# Patient Record
Sex: MALE | Race: BLACK OR AFRICAN AMERICAN | NOT HISPANIC OR LATINO | Employment: FULL TIME | URBAN - METROPOLITAN AREA
[De-identification: names, ages, dates, MRNs, and addresses within clinical notes are randomized per-mention and may not be internally consistent; named-entity substitution may affect disease eponyms.]

---

## 2023-05-18 ENCOUNTER — APPOINTMENT (OUTPATIENT)
Dept: PRIMARY CARE | Facility: CLINIC | Age: 33
End: 2023-05-18
Payer: MEDICARE

## 2023-06-19 LAB
ANION GAP IN SER/PLAS: 12 MMOL/L (ref 10–20)
BASOPHILS (10*3/UL) IN BLOOD BY MANUAL COUNT - WAM: 0.22 X10E9/L (ref 0–0.1)
BASOPHILS/100 LEUKOCYTES IN BLOOD BY MANUAL COUNT - WAM: 2 % (ref 0–2)
CALCIUM (MG/DL) IN SER/PLAS: 9.6 MG/DL (ref 8.6–10.3)
CARBON DIOXIDE, TOTAL (MMOL/L) IN SER/PLAS: 29 MMOL/L (ref 21–32)
CHLORIDE (MMOL/L) IN SER/PLAS: 100 MMOL/L (ref 98–107)
CREATININE (MG/DL) IN SER/PLAS: 0.75 MG/DL (ref 0.5–1.3)
EOSINOPHILS (10*3/UL) IN BLOOD BY MANUAL COUNT - WAM: 0.22 X10E9/L (ref 0–0.7)
EOSINOPHILS/100 LEUKOCYTES IN BLOOD BY MANUAL COUNT - WAM: 2 % (ref 0–6)
ERYTHROCYTE DISTRIBUTION WIDTH (RATIO) BY AUTOMATED COUNT: 15.2 % (ref 11.5–14.5)
ERYTHROCYTE MEAN CORPUSCULAR HEMOGLOBIN CONCENTRATION (G/DL) BY AUTOMATED: 30.8 G/DL (ref 32–36)
ERYTHROCYTE MEAN CORPUSCULAR VOLUME (FL) BY AUTOMATED COUNT: 73 FL (ref 80–100)
ERYTHROCYTES (10*6/UL) IN BLOOD BY AUTOMATED COUNT: 5.51 X10E12/L (ref 4.5–5.9)
GFR MALE: >90 ML/MIN/1.73M2
GLUCOSE (MG/DL) IN SER/PLAS: 85 MG/DL (ref 74–99)
HEMATOCRIT (%) IN BLOOD BY AUTOMATED COUNT: 40.3 % (ref 41–52)
HEMOGLOBIN (G/DL) IN BLOOD: 12.4 G/DL (ref 13.5–17.5)
IMMATURE GRANULOCYTES/100 LEUKOCYTES IN BLOOD BY AUTOMATED COUNT: 0.3 % (ref 0–0.9)
INR IN PPP BY COAGULATION ASSAY: 1.1 (ref 0.9–1.1)
LEUKOCYTES (10*3/UL) IN BLOOD BY AUTOMATED COUNT: 10.9 X10E9/L (ref 4.4–11.3)
LYMPHOCYTES (10*3/UL) IN BLOOD BY MANUAL COUNT - WAM: 3.16 X10E9/L (ref 1.2–4.8)
LYMPHOCYTES VARIANT/100 LEUKOCYTES IN BLOOD - WAM: 1 % (ref 0–2)
LYMPHOCYTES/100 LEUKOCYTES IN BLOOD BY MANUAL COUNT - WAM: 29 % (ref 13–44)
MANUAL DIFFERENTIAL Y/N: ABNORMAL
MONOCYTES (10*3/UL) IN BLOOD BY MANUAL COUNT - WAM: 0.98 X10E9/L (ref 0.1–1)
MONOCYTES/100 LEUKOCYTES IN BLOOD BY MANUAL COUNT - WAM: 9 % (ref 2–10)
NEUTROPHILS (SEGS+BANDS) (10*3/UL) MANUAL COUNT - WAM: 6.21 X10E9/L (ref 1.2–7.7)
PLATELETS (10*3/UL) IN BLOOD AUTOMATED COUNT: 215 X10E9/L (ref 150–450)
POTASSIUM (MMOL/L) IN SER/PLAS: 3.7 MMOL/L (ref 3.5–5.3)
PROTHROMBIN TIME (PT) IN PPP BY COAGULATION ASSAY: 12.7 SEC (ref 9.8–13.4)
RBC MORPHOLOGY IN BLOOD: NORMAL
SEGMENTED NEUTROPHILS (10*3/UL) BLOOD MANUAL - WAM: 6.21 X10E9/L (ref 1.2–7)
SEGMENTED NEUTROPHILS/100 LEUKOCYTES BY MANUAL COUNT -: 57 % (ref 40–80)
SODIUM (MMOL/L) IN SER/PLAS: 137 MMOL/L (ref 136–145)
UREA NITROGEN (MG/DL) IN SER/PLAS: 12 MG/DL (ref 6–23)
VARIANT LYMPHOCYTES (10*3/UL) BLOOD MANUAL COUNT - WAM: 0.11 X10E9/L (ref 0–0.5)

## 2023-06-22 ENCOUNTER — HOSPITAL ENCOUNTER (OUTPATIENT)
Dept: DATA CONVERSION | Facility: HOSPITAL | Age: 33
End: 2023-06-22
Attending: ORTHOPAEDIC SURGERY | Admitting: ORTHOPAEDIC SURGERY
Payer: MEDICARE

## 2023-06-22 DIAGNOSIS — F17.200 NICOTINE DEPENDENCE, UNSPECIFIED, UNCOMPLICATED: ICD-10-CM

## 2023-06-22 DIAGNOSIS — Z88.0 ALLERGY STATUS TO PENICILLIN: ICD-10-CM

## 2023-06-22 DIAGNOSIS — S82.61XA DISPLACED FRACTURE OF LATERAL MALLEOLUS OF RIGHT FIBULA, INITIAL ENCOUNTER FOR CLOSED FRACTURE: ICD-10-CM

## 2023-06-22 DIAGNOSIS — S82.891A OTHER FRACTURE OF RIGHT LOWER LEG, INITIAL ENCOUNTER FOR CLOSED FRACTURE: ICD-10-CM

## 2023-06-22 DIAGNOSIS — S93.431A SPRAIN OF TIBIOFIBULAR LIGAMENT OF RIGHT ANKLE, INITIAL ENCOUNTER: ICD-10-CM

## 2023-09-07 VITALS — BODY MASS INDEX: 31.65 KG/M2 | WEIGHT: 246.47 LBS

## 2023-09-30 NOTE — H&P
History & Physical Reviewed:   I have reviewed the History and Physical dated:  19-Jun-2023   History and Physical reviewed and relevant findings noted. Patient examined to review pertinent physical  findings.: No significant changes   Home Medications Reviewed: no changes noted   Allergies Reviewed: no changes noted       ERAS (Enhanced Recovery After Surgery):  ·  ERAS Patient: no     Consent:   COVID-19 Consent:  ·  COVID-19 Risk Consent Surgeon has reviewed key risks related to the risk of mauro COVID-19 and if they contract COVID-19 what the risks are.     Attestation:   Note Completion:  I am a:  Resident/Fellow   Attending Attestation I saw and evaluated the patient.  I personally obtained the key and critical portions of the history and physical exam or was physically present for key and  critical portions performed by the resident/fellow. I reviewed the resident/fellow?s documentation and discussed the patient with the resident/fellow.  I agree with the resident/fellow?s medical decision making as documented in the note.     I personally evaluated the patient on 22-Jun-2023         Electronic Signatures:  Hunter Ryan)  (Signed 01-Jul-2023 20:08)   Authored: Note Completion   Co-Signer: History & Physical Reviewed, ERAS, Consent, Note Completion  Marci West (Resident))  (Signed 22-Jun-2023 12:08)   Authored: History & Physical Reviewed, ERAS, Consent,  Note Completion      Last Updated: 01-Jul-2023 20:08 by Hunter Ryan)

## 2023-10-02 NOTE — OP NOTE
PROCEDURE DETAILS    Preoperative Diagnosis:  Right ankle fracture with joint subluxation and syndesmosis disruption   Postoperative Diagnosis:  Right ankle fracture with joint subluxation and syndesmosis disruption   Surgeon: Dr. Ryan  Resident/Fellow/Other Assistant: Ellyn    Procedure:  1) ORIF R lateral malleolus  2) ORIF R syndesmosis   3) R ankle application of stress for radiograph  Anesthesia: General  Estimated Blood Loss: 25 cc  Blood Replaced: None  Findings:  ankle fracture with wide syndesmosis on stress examination prior to fixation  Specimens(s) Collected: no,     Complications: None  Urine Output: None  Drains and/or Catheters: None  Implants: Decker lateral fibular plate with 3.5 mm screws, 2x elie mini-frag 2.7 mm lag screws, Decker synchfix x1  Tourniquet Times: 104 minutes at 300 mmHg  Additional Details: Weightbearing Status/precautions: NWB RLE  Imaging: None  Perioperative ABx: None   DVT PPx: ASA 81 mg BID  Dressing: Splint  Drain: None  Truong: None    Dispo: Home from PACU  Please call or page with questions or concerns.  Patient Returned To/Condition: PACU to home/Good        Operative Report:   INDICATIONS:  Is a 33-year-old male who sustained a right ankle fracture.  He was seen in the emergency department and was determined to have a spiral lateral malleolus fracture and a widened syndesmosis without a medial malleolus fracture.  He was placed into a splint  in the emergency department and was discharged nonweightbearing for follow-up for surgery.  Surgery was recommended for this patient for pain control, increase function, and restoration of ankle stability.  Risk, benefits, and possible complications were  discussed and the patient was agreeable to proceed with surgery.    DESCRIPTION OF PROCEDURE:     On the day of surgery, Patient was met in the preoperative holding bay prior to surgery.  Informed consent was reviewed.  All risk and benefits were explained  which included but were not limited to bleeding, infection, damage to surrounding structures  and need for further surgery.  All questions were answered and informed consent was obtained.  The right lower extremity was marked.  patient was then brought back from the preoperative holding bay to the operating room.   A timeout was performed confirming  correct patient identity, laterality, and planned procedure.  Patient was then transitioned from the hospital bed to the operating room table.  General anesthesia was induced without complication. Appropriate antibiotics and TXA were given by the anesthesia  team. Patient was positioned supine.  All bony prominences were padded.  Compressive devices placed on the nonoperative lower extremity.  Preoperative antibiotics were redosed.  The right lower extremity was then prepped and draped in normal sterile fashion.     We began with an incision centered over the lateral malleolus from the distal tip to approximately 8 cm proximal.  This incision was taken to the level of the fascia being careful to carefully provide hemostasis to any crossing vessels, as well as being  certain that the superficial peroneal nerve did not cross our field.  The fascia was incised above the fibula along the length of the incision and the fracture was exposed.  The fracture was cleaned of the periosteum at its edges as well as interposed  fracture hematoma.  The fracture was then opened and the ankle joint was washed out of any debris from the fracture.  There appeared to be no chondral injury within the ankle joint.  The fracture was then reduced using a combination of manual traction  on the lamina and 2 pointed reduction clamps.  Being satisfied with our reduction both clinically and fluoroscopically, two 2.7 mm lag screws were placed by technique in the plane orthogonal to the fracture from the anterior to posterior direction.  A  medium length 3.5 mm Synthes lateral fibular plate was then  slid onto the bone and fluoroscopic images were used to confirm appropriate position on the AP and lateral views.  A screw just proximal to the proximal extent of the fracture was placed in order  to fix the plate to the bone proximally.  A unicortical distal cortical screw was then placed in one of the holes in the distal cluster in order to fix the plate to the bone distally.  Again, fluoroscopy was used to confirm our plate was adequately positioned  on both the AP and lateral views and a fracture remained reduced.  Our clamps were then removed and the fracture remained reduced as well.  2 more proximal locking screws and 3 more distal locking screws were then placed about the fracture.    We then performed a stress examination of the ankle which demonstrated obvious widening of both the syndesmosis and medial clear space with external rotation stress test.  Because of this, syndesmosis fixation was indicated.  We elected to use a Synchfix  device to do this.  Our lag screws were blocking the 2 holes closest to the syndesmosis, so the hole just above this was used to provide our syndesmotic fixation.  Fluoroscopy was used to ensure that the suture button fixation was parallel to the joint  on the mortise view and was aiming from posterior to anterior in the center of the tibia on the lateral view.  The guidewire was sent across this and through the skin on the medial side of the leg.  Small incision was made at the exit point of the guidewire,  and the guidewire was removed.  The proximal cortex of the fibula was countersunk so the button would fit into it nicely, and the wire for the suture was threaded through the hole and removed from the medial side.  The suture button was attached and fluoroscopy  was used to ensure that it laid flat on the medial side of the bone.  Suture button nestled into the countersunk hole within the lateral fibular plate and was tightened while holding dorsiflexion and compression  across the syndesmosis.  Suture button  fixation was then tied and cut and fluoroscopic images were used to confirm adequate reduction of the syndesmosis on x-ray.  Both an external rotation stress test and a cotton syndesmosis test were negative at this time.  Being satisfied with our fixation,  final x-rays were taken in the AP and lateral mortise views.  The wound was copiously irrigated and vancomycin and tobramycin powder were applied.  The deep fascia was closed with 0 PDS, the skin and subcutaneous tissue was closed with 2-0 Monocryl, and  the skin was closed with 3-0 nylon.  A short leg splint was placed.  The patient was awoken from anesthesia and taken to the PACU without Issue.    Dr. Ryan was present and scrubbed for all critical portions of the case.    POST-OPERATIVE PLAN:  The patient was recovered in the PACU, and we will plan for discharge home same day if pain is adequately controlled and patient awakens from anesthesia  appropriately.  He will remain nonweightbearing on the right lower extremity.  He will receive aspirin twice a day for DVT prophylaxis.  He will follow-up in 3 weeks for suture removal and repeat x-rays of the right ankle.                        Attestation:   Note Completion:  Attending Attestation I was present for the entire procedure    I am a: Resident/Fellow         Electronic Signatures:  Mike Bruno (Resident))  (Signed 22-Jun-2023 17:10)   Authored: Post-Operative Note, Chart Review, Note Completion  Hunter Ryan)  (Signed 25-Jun-2023 15:45)   Authored: Post-Operative Note, Chart Review, Note Completion   Co-Signer: Post-Operative Note, Chart Review, Note Completion      Last Updated: 25-Jun-2023 15:45 by Hunter Ryan)

## 2024-03-06 NOTE — CONSULTS
Service:   Service: Anesthesia - Pain     Consult:  Consult requested by (Attending Name): Dr. Ryan   Reason: Postoperative Pain     History of Present Illness:   HPI:    KOURTNEY EVANS is a 33 year old Male who presents for right ankle ORIF with Dr. Ryan on 6/22/23. Acute Pain consulted for block for postoperative pain control.     Anticipated Postop Pain Issues -   Palliative: typically relieved with IV analgesics  Provocative: typically with movement  Quality: typically burning and aching  Radiation: typically none  Severity: typically severe 8-10/10  Timing: typically constant    PMH:  None    PSH:  None    FHx:  No hx of anesthetic complications    SHx:  No tobacco, alcohol, or drugs    Allergies:   Amoxicillin    ROS:   Constitutional: No fevers, no rash, no chills  Cardiovascular: No chest pain, no syncope.   Respiratory: No cough, shortness of breath  GI: No abdominal pain, no nausea, no vomiting. No diarrhea.   Vascular: No lower extremity pain.   Neuro: No seizures, weakness, or other neuro symptoms.  : No urinary symptoms. No burning with urination or incontinence.   MSK: No extremity muscle pain or weakness.   Endocrine: No evidence of heat intolerance, or cold intolerance or other endocrine symptoms:  Hematologic: No easy bleeding, bruising, petechiae, or purpura.   Skin: No rash or itching.            Allergies:  ·  amoxicillin : Unknown    Objective:   Physical Exam Narrative:  ·  Physical Exam:    Physical Exam  Constitutional: Well developed, awake/alert, no distress, alert and cooperative  Skin: Warm and dry, no lesions, no rashes  Eyes: EOMI, clear sclera  ENMT: mucous membranes moist, no apparent injury, no lesions seen  Head/Neck: Normocephalic, no apparent injury  Respiratory/Thorax: Breathing comfortably on room air  Cardiovascular: Warm and well perfused  Gastrointestinal: Non-distended  Extremities: Normal extremities, no cyanosis, edema, contusions, no  clubbing  Psychological: Appropriate mood and behavior       Assessment:    KOURTNEY EVANS is a 33 year old Male who presents for right ankle ORIF with Dr. Ryan on 6/22/23. Acute Pain consulted for block for postoperative pain control.     - Right popliteal and saphenous single shot nerve blocks performed preoperatively  - Pain medications per primary team  - Can consider dosage of pain medication prior to work with PT  - Encourage IS and work with PT  - Will see on POD1    Acute Pain Resident  pg [phone] ph [phone]     Attestation:   Note Completion:  I am a:  Resident/Fellow   Attending Attestation I saw and evaluated the patient.  I personally obtained the key and critical portions of the history and physical exam or was physically present for key and  critical portions performed by the resident/fellow. I reviewed the resident/fellow?s documentation and discussed the patient with the resident/fellow.  I agree with the resident/fellow?s medical decision making as documented in the note.     I personally evaluated the patient on 22-Jun-2023         Electronic Signatures:  Sangita Gamez)  (Signed 23-Jun-2023 13:36)   Authored: Note Completion   Co-Signer: Service, History of Present Illness, Allergies, Objective, Assessment/Recommendations, Note Completion  Dom Sparrow (Resident))  (Signed 23-Jun-2023 06:02)   Authored: Service, History of Present Illness, Allergies,  Objective, Assessment/Recommendations, Note Completion      Last Updated: 23-Jun-2023 13:36 by Sangita Gamez)